# Patient Record
Sex: MALE | URBAN - METROPOLITAN AREA
[De-identification: names, ages, dates, MRNs, and addresses within clinical notes are randomized per-mention and may not be internally consistent; named-entity substitution may affect disease eponyms.]

---

## 2018-07-18 ENCOUNTER — TELEPHONE (OUTPATIENT)
Dept: OTHER | Facility: CLINIC | Age: 78
End: 2018-07-18

## 2018-07-18 NOTE — TELEPHONE ENCOUNTER
I received a page from JASON Calvillo from [facility] to discuss the management of this patient who is currently hospitalized with necrotizing pancreatitis. The history I received was that this is a 77 year old male who was admitted on 6/13 with gallstone necrotizing pancreatitis who shortly afterwards underwent an ERCP with biliary stent placement. 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 Patient's imaging was reviewed earlier this month by our Panc-bili service on ~7/5 when patient had developed signs of infected necrosis with gas in the collection. Collection was not amenable to endoscopic drainage at that time and it was recommended he under go percutaneous drainage. He underwent retroperitoneal percutaneous drainage on 7/12 at [facility]. The fluid from this collection is growing ESBL E. Coli. Last cross section imaging available is from 7/13 which was pushed over to our system to review. Clinically, the patient is hemodynamically stable on a monitored tele bed on room air. The questions is about further management. I reviewed the CT from 7/13 along with input from Dr. Aleman as well. There appears to be a relatively small caliber percutaneous drain in the inferior portion of the RUQ collection which extends cephalad towards the spleen. Almost certainly at some point the splenic pseudoaneurysm bled into this collection as more evident on his CT from 7/5. The collection is now more mature and appears to be adherent to the gastric wall. I recommended to more aggressively achieve drainage of this infected walled off necrosis by either endoscopic transgastric drainage and/or more aggressive percutaneous drainage by upsizing the percutaneous drain and aggressive lavage through the catheter with possible repositioning more cephalad. If IR did not feel comfortable doing that, then we could discuss possible transfer to [facility] for  further care. I explained this would need to be coordinated through our hospitalists. JASON Calvillo said she would discuss this with her staff (Dr. Marino Ibrahim) and contact us if they wished to proceed with transfer.

## 2018-07-19 ENCOUNTER — HOSPITAL ENCOUNTER (OUTPATIENT)
Age: 78
End: 2018-07-19
Attending: INTERNAL MEDICINE | Admitting: INTERNAL MEDICINE

## 2018-07-19 NOTE — PROGRESS NOTES
[facility]  Transfer Triage Note    Date of call: 07/19/18  Time of call: 1:13 PM    Reason for Transfer:Procedure can be done here and not at referring hospital  Diagnosis: Necrotic pancreatitis    Outside Records: Available  Additional records requested to be faxed to [phone].    Stability of Patient: Patient is vitally stable, with no critical labs, and will likely remain stable throughout the transfer process    Expected Time of Arrival for Transfer: 8-24 hours    Recommendations for Management and Stabilization: Not needed    Additional Comments Please see GI note from Dr Blackman dated 7/18 for details. He is coming in for potential endoscopic drainage of pancreatic WOC as attempts to drain the necrosis by upsizing the prior percutaneous drain on 7/18 by IR was not successful.     Nate Ford MD